# Patient Record
Sex: MALE | Race: WHITE
[De-identification: names, ages, dates, MRNs, and addresses within clinical notes are randomized per-mention and may not be internally consistent; named-entity substitution may affect disease eponyms.]

---

## 2020-01-08 ENCOUNTER — HOSPITAL ENCOUNTER (OUTPATIENT)
Dept: HOSPITAL 62 - RAD | Age: 50
End: 2020-01-08
Attending: INTERNAL MEDICINE
Payer: COMMERCIAL

## 2020-01-08 DIAGNOSIS — R10.9: Primary | ICD-10-CM

## 2020-01-08 PROCEDURE — A9537 TC99M MEBROFENIN: HCPCS

## 2020-01-08 PROCEDURE — 78227 HEPATOBIL SYST IMAGE W/DRUG: CPT

## 2020-01-08 NOTE — RADIOLOGY REPORT (SQ)
EXAM DESCRIPTION:  NM HIDA SCAN WITH CCK



COMPLETED DATE/TIME:  1/8/2020 10:22 am



REASON FOR STUDY:  UNSPECIFIED ABDOMINAL PAIN R10.9  UNSPECIFIED ABDOMINAL PAIN



COMPARISON:  None.



RADIONUCLIDE AND DOSE:  DOSAGE RADIONUCLIDE: 5 millicuries Tc99m Mebrofenin.

DOSAGE CCK: 2.5 micrograms.

DOSAGE MORPHINE: Not required.

The route of agent administration: Intravenous



TECHNIQUE:  Serial imaging right upper quadrant up to 60 minutes following injection of radionuclide.
  CCK injected after gallbladder visualized.



LIMITATIONS:  None.



FINDINGS:  LIVER: Normal visualization without areas of photopenia.

INTRAHEPATIC BILE DUCTS: Normal size and no delay in visualization.

COMMON BILE DUCT: Normal without dilatation.

GALLBLADDER:   Normal visualization.  Calculated ejection fraction of 33%. Normal range is greater th
an 35%.

PHYSICAL RESPONSE:  Patients presenting complaint was reproduced.

OTHER: No other significant finding.



IMPRESSION:  Mildly decreased gallbladder function with an ejection fraction of 33% where 35% or grea
ter is considered normal.  Patient's symptoms were reproduced upon CCK administration.



TECHNICAL DOCUMENTATION:  JOB ID:  [id number]

 2011 [employer]- All Rights Reserved



Reading location - IP/workstation name: [email or web address]

## 2020-02-18 ENCOUNTER — HOSPITAL ENCOUNTER (OUTPATIENT)
Dept: HOSPITAL 62 - OD | Age: 50
End: 2020-02-18
Attending: SURGERY
Payer: COMMERCIAL

## 2020-02-18 DIAGNOSIS — Z01.812: ICD-10-CM

## 2020-02-18 DIAGNOSIS — Z01.810: Primary | ICD-10-CM

## 2020-02-18 DIAGNOSIS — R05: ICD-10-CM

## 2020-02-18 DIAGNOSIS — Z01.818: ICD-10-CM

## 2020-02-18 LAB
ALBUMIN SERPL-MCNC: 4.3 G/DL (ref 3.5–5)
ALP SERPL-CCNC: 38 U/L (ref 38–126)
ANION GAP SERPL CALC-SCNC: 11 MMOL/L (ref 5–19)
AST SERPL-CCNC: 45 U/L (ref 17–59)
BILIRUB DIRECT SERPL-MCNC: 0 MG/DL (ref 0–0.4)
BILIRUB SERPL-MCNC: 0.4 MG/DL (ref 0.2–1.3)
BUN SERPL-MCNC: 16 MG/DL (ref 7–20)
CALCIUM: 9.6 MG/DL (ref 8.4–10.2)
CHLORIDE SERPL-SCNC: 100 MMOL/L (ref 98–107)
CO2 SERPL-SCNC: 27 MMOL/L (ref 22–30)
ERYTHROCYTE [DISTWIDTH] IN BLOOD BY AUTOMATED COUNT: 13.4 % (ref 11.5–14)
GLUCOSE SERPL-MCNC: 284 MG/DL (ref 75–110)
HCT VFR BLD CALC: 44.2 % (ref 37.9–51)
HGB BLD-MCNC: 15.5 G/DL (ref 13.5–17)
MCH RBC QN AUTO: 31.7 PG (ref 27–33.4)
MCHC RBC AUTO-ENTMCNC: 35 G/DL (ref 32–36)
MCV RBC AUTO: 90 FL (ref 80–97)
PLATELET # BLD: 111 10^3/UL (ref 150–450)
POTASSIUM SERPL-SCNC: 4.8 MMOL/L (ref 3.6–5)
PROT SERPL-MCNC: 7 G/DL (ref 6.3–8.2)
RBC # BLD AUTO: 4.89 10^6/UL (ref 4.35–5.55)
WBC # BLD AUTO: 9.5 10^3/UL (ref 4–10.5)

## 2020-02-18 PROCEDURE — 36415 COLL VENOUS BLD VENIPUNCTURE: CPT

## 2020-02-18 PROCEDURE — 93005 ELECTROCARDIOGRAM TRACING: CPT

## 2020-02-18 PROCEDURE — 71046 X-RAY EXAM CHEST 2 VIEWS: CPT

## 2020-02-18 PROCEDURE — 85027 COMPLETE CBC AUTOMATED: CPT

## 2020-02-18 PROCEDURE — 93010 ELECTROCARDIOGRAM REPORT: CPT

## 2020-02-18 PROCEDURE — 80053 COMPREHEN METABOLIC PANEL: CPT

## 2020-02-18 NOTE — EKG REPORT
SEVERITY:- BORDERLINE ECG -

SINUS RHYTHM

BORDERLINE T ABNORMALITIES, INFERIOR LEADS

:

Confirmed by: Jasbir Garza 18-Feb-2020 11:39:31

## 2020-02-18 NOTE — RADIOLOGY REPORT (SQ)
EXAM DESCRIPTION:  CHEST PA/LATERAL



COMPLETED DATE/TIME:  2/18/2020 9:47 am



REASON FOR STUDY:  PRE-OP



COMPARISON:  None.



EXAM PARAMETERS:  NUMBER OF VIEWS: two views

TECHNIQUE: Digital Frontal and Lateral radiographic views of the chest acquired.

RADIATION DOSE: NA

LIMITATIONS: none



FINDINGS:  LUNGS AND PLEURA: No opacities, masses or pneumothorax. No pleural effusion.

MEDIASTINUM AND HILAR STRUCTURES: No masses or contour abnormalities.

HEART AND VASCULAR STRUCTURES: Heart normal size.  No evidence for failure.

BONES: No acute findings.

HARDWARE: None in the chest.

OTHER: No other significant finding.



IMPRESSION:  NO SIGNIFICANT RADIOGRAPHIC FINDING IN THE CHEST.



TECHNICAL DOCUMENTATION:  JOB ID:  [id number]

 2011 [employer]- All Rights Reserved



Reading location - IP/workstation name: [facility]

## 2020-02-19 ENCOUNTER — HOSPITAL ENCOUNTER (OUTPATIENT)
Dept: HOSPITAL 62 - OROUT | Age: 50
Discharge: HOME | End: 2020-02-19
Attending: SURGERY
Payer: COMMERCIAL

## 2020-02-19 VITALS — DIASTOLIC BLOOD PRESSURE: 78 MMHG | SYSTOLIC BLOOD PRESSURE: 138 MMHG

## 2020-02-19 DIAGNOSIS — Z87.891: ICD-10-CM

## 2020-02-19 DIAGNOSIS — E11.9: ICD-10-CM

## 2020-02-19 DIAGNOSIS — E07.9: ICD-10-CM

## 2020-02-19 DIAGNOSIS — K81.1: Primary | ICD-10-CM

## 2020-02-19 DIAGNOSIS — Z79.899: ICD-10-CM

## 2020-02-19 DIAGNOSIS — I10: ICD-10-CM

## 2020-02-19 PROCEDURE — 00790 ANES IPER UPR ABD NOS: CPT

## 2020-02-19 PROCEDURE — 47562 LAPAROSCOPIC CHOLECYSTECTOMY: CPT

## 2020-02-19 PROCEDURE — 88304 TISSUE EXAM BY PATHOLOGIST: CPT

## 2020-02-19 NOTE — DISCHARGE SUMMARY
Discharge Summary (SDC)





- Discharge


Final Diagnosis: 





Symptomatic gallstones


Date of Surgery: 02/19/20


Discharge Date: 02/19/20


Condition: Stable


Treatment or Instructions: 


Discharge home.  Diet as tolerated.  Activity: No lifting greater than 10 pounds

x 2 weeks.  Follow-up with me in 7 to 10 days at [facility].  

Ibuprofen 800 mg p.o. 3 times daily with meals.  Hydrocodone 10/325 mg p.o. 

every 6 hours PRN for pain.  Okay to shower on Friday.


Prescriptions: 


Ibuprofen [Motrin 800 mg Tablet] 800 mg PO MEALS #42 tablet


Hydrocodone/Acetaminophen [Norco  mg Tablet] 1 tab PO Q6HP PRN #15 tablet


 PRN Reason: For Pain


Referrals: 


EARLE BERG MD [Primary Care Provider] - 


Discharge Diet: As Tolerated


Respiratory Treatments at Home: Deep Breathing/Coughing, Incentive Spirometer


Discharge Activity: No Lifting Over 10 Pounds, No Lifting/Push/Pulling


Home Care Assistance: None Needed


Report the Following to Your Physician Immediately: Shortness of Breath, Nausea,

Vomiting, Increase in Pain, Yellow Skin, Fever over 101 Degrees, Unusual 

Bleeding

## 2020-02-19 NOTE — OPERATIVE REPORT
Nonrecallable Operative Report


DATE OF SURGERY: 02/19/20


PREOPERATIVE DIAGNOSIS: Symptomatic gallstones


POSTOPERATIVE DIAGNOSIS: Same as above


OPERATION: laparoscopic cholecystectomy


SURGEON: MARY MEHTA


ANESTHESIA: GA


TISSUE REMOVED OR ALTERED: Gallbladder


COMPLICATIONS: 





None apparent


ESTIMATED BLOOD LOSS:  minimal


PROCEDURE: 





Drains/implants: None.





Procedure in detail: After informed consent was obtained, the patient was 

brought to the operating room and laid in the supine position.  The area of the 

abdomen was prepped and draped in a normal sterile fashion.  A supraumbilical 

incision was created with a 15 blade scalpel.  Dissection was carried through 

the subcutaneous tissues using sharp and blunt dissection.  The cicatrix was 

identified, grasped with a Kocher clamp, and retracted upwards.  The linea alba 

fascia was incised sharply, the abdomen was entered sharply.  The balloon trocar

was inserted, and pneumoperitoneum was achieved.





The subxiphoid 5 mm port was then placed under direct laparoscopic 

visualization.  2 more 5 mm ports were placed in the right upper quadrant in 

similar fashion.  Atraumatic graspers were placed through the 5 mm ports.  The 

gallbladder was retracted cephalad and laterally.  Dissection was begun in the 

triangle of [place].  The cystic duct and cystic artery were fully visualized and 

skeletonized, seeing the liver through the triangle.  Once the critical view of 

safety was obtained, the cystic artery and cystic duct were clipped and cut with

laparoscopic instruments.  The gallbladder was then removed from the liver using

Bovie electrocautery.  The gallbladder was grasped with a large clamp, and 

extracted through the supraumbilical incision.  The camera was reinserted.  The 

hilum was inspected.  It was found to be free of any leakage of blood or bile.  

Once this was confirmed, the 5 mm trochars were removed under direct 

laparoscopic visualization.  The supraumbilical trocar was removed, and 

pneumoperitoneum was relieved.





The supraumbilical fascia was closed using 0 Vicryl suture in figure-of-eight 

fashion.  The overlying skin was closed using 4-0 Vicryl Rapide suture in 

subcuticular fashion.  Dressings were placed, and the procedure was concluded.  

All sponge, instrument, and needle counts were correct x2.





Condition: Stable.